# Patient Record
(demographics unavailable — no encounter records)

---

## 2017-09-08 NOTE — ERD
ER Documentation


Chief Complaint


Date/Time


DATE: 17 


TIME: 16:37


Chief Complaint


Complains of left knee pain since yesterday





HPI


. This is a 20-year-old male presents to the ER with left knee pain that 

started yesterday after he was kneeling down and working.  Patient states that 

area became swollen and area is more painful now.  He denies any fevers or 

chills.  He denies any other trauma to the knee.  He denies any numbness or 

tingling to the knee.





ROS


12 point review of systems was done, all negative except per HPI.





Medications


Home Meds


Active Scripts


Ibuprofen* (Motrin*) 600 Mg Tab, 600 MG PO Q6, #30 TAB


   Prov:YANG FOLEY         17





Allergies


Allergies:  


Coded Allergies:  


     No Known Drug Allergies (Verified  Allergy, Unknown, 14)





PMhx/Soc


History of Surgery:  No


Anesthesia Reaction:  No


Hx Neurological Disorder:  No


Hx Respiratory Disorders:  No


Hx Cardiac Disorders:  No


Hx Psychiatric Problems:  No


Hx Miscellaneous Medical Probl:  No


Hx Alcohol Use:  No


Hx Substance Use:  No


Hx Tobacco Use:  No





Physical Exam


Vitals





Vital Signs








  Date Time  Temp Pulse Resp B/P Pulse Ox O2 Delivery O2 Flow Rate FiO2


 


17 14:01 98.0 56 20 126/64 96   








Physical Exam


GENERAL: The patient is well developed and appropriate for usual state of health

, in no apparent distress. 


HEENT: Atraumatic 


CHEST: Clear to auscultation bilaterally. There are no rales, wheezes or 

rhonchi. 


HEART: Regular rate and rhythm. No murmurs, clicks, rubs or gallops. 


EXTREMITIES: Left knee: Patient is able to bear weight and ambulate without 

pain. No surface trauma. No overlying erythema or warmth. The Left knee is 

without obvious asymmetry when compared to the right knee. Patient has painful 

and limited flexion of the right knee.  he is able to fully extend knee, 

internal and external rotation. Not tender to palpation over the patella, no 

effusion. + bursitis. Not tender over the medial or lateral joint line, or the 

medial or lateral tibial plateau. Not tender to palpation over the proximal 

fibular head. No quadricep tenderness. No laxity of the ACL, PCL, MCL or LCL. 

Negative Lachman test. Negative anterior and posterior drawer. Distal motor 

neurovascular status intact. 


NEURO: Alert and oriented 


SKIN: The skin is warm and dry.


Results 24 hrs





 Current Medications








 Medications


  (Trade)  Dose


 Ordered  Sig/Cindy


 Route


 PRN Reason  Start Time


 Stop Time Status Last Admin


Dose Admin


 


 Ketorolac


 Tromethamine


  (Toradol)  60 mg  ONCE  STAT


 IM


   17 14:53


 17 14:57 DC 17 15:39


 





[place] [id number]


 Radiology Main Line: [phone]





 DIAGNOSTIC IMAGING REPORT





 Patient: REYES,MIKEN   : 1997   Age: 20  Sex: M                      

  


 MR #:    [id number]   Acct #:   [id number]    DOS: 17 0000


 Ordering MD: YANG FOLEY PA-C   Location:  [facility]   Room/Bed:              

                              


 








PROCEDURE:   Left knee series. 


 


CLINICAL INDICATION:   Left knee pain


 


TECHNIQUE:   Three views of the left knee. 


 


COMPARISON:   None available 


 


FINDINGS:


 


There is normal mineralization and alignment of the left knee.  No acute 

fracture or dislocation is seen.  Joint spaces are well maintained.  There is 

no evidence of osteophyte formation or erosion.  No definite joint effusion is 

seen. The soft tissues are within normal limits.   


 


IMPRESSION:


 


1.  Unremarkable left knee x-ray series.


 


Of text in the [place]


_____________________________________________ 


.Matti Velazquez MD, MD           Date    Time 


Electronically viewed and signed by .Matti Velazquez MD, MD on 2017 15:40 


 


D:  2017 15:40  T:  2017 15:40


.B/





CC: YANG FOLEY





Procedures/MDM


Differential diagnosis includes but is not limited to knee contusion, knee 

sprain, ligament injury, patellar dislocation, joint dislocation, patellar or 

tibial plateau fracture, Baker's cyst, DVT, meniscus tear, prepatellar bursitis

, septic joint, gout, tumor. This is a 20-year-old male presents to the ER with 

left knee pain after he was kneeling down his knee for a long period of time.  

Patient does appear to have minor bursitis of the knee.  Suspicion for fracture

, dislocation, meniscus tear is low.  Patient is afebrile and well-appearing 

suspicion for infectious etiology such as septic joint is low.  Patient will be 

sent home with ibuprofen.  He is to follow-up with his primary care doctor 

within 1-2 days return to ER sooner if symptoms worsen.  My medical decision 

making shared with the patient understands and agrees with plan.





Departure


Diagnosis:  


 Primary Impression:  


 Knee pain


Condition:  Stable


Patient Instructions:  Bursitis





Additional Instructions:  


Llame al doctor RAKESH y donovan crystal HAYDE PARA DENTRO DE 1-2 LIVE.Dgale a la 

secretaria que nosotros le instruimos hacer esta hayde.Avise o llame si [place] 

condicin se empeora antes de la hayde. Regresa aqui si peor o no mejor.











YANG FOLEY Sep 8, 2017 16:41

## 2017-09-08 NOTE — RADRPT
PROCEDURE:   Left knee series. 

 

CLINICAL INDICATION:   Left knee pain

 

TECHNIQUE:   Three views of the left knee. 

 

COMPARISON:   None available 

 

FINDINGS:

 

There is normal mineralization and alignment of the left knee.  No acute fracture or dislocation is 
seen.  Joint spaces are well maintained.  There is no evidence of osteophyte formation or erosion.  
No definite joint effusion is seen. The soft tissues are within normal limits.   

 

IMPRESSION:

 

1.  Unremarkable left knee x-ray series.

 

Of text in the [place]

_____________________________________________ 

.Matti Velazquez MD, MD           Date    Time 

Electronically viewed and signed by .Matti Velazquez MD, MD on 09/08/2017 15:40 

 

D:  09/08/2017 15:40  T:  09/08/2017 15:40

.B/